# Patient Record
Sex: FEMALE | Race: WHITE | NOT HISPANIC OR LATINO | URBAN - METROPOLITAN AREA
[De-identification: names, ages, dates, MRNs, and addresses within clinical notes are randomized per-mention and may not be internally consistent; named-entity substitution may affect disease eponyms.]

---

## 2017-08-14 ENCOUNTER — TELEPHONE (OUTPATIENT)
Dept: TRANSPLANT | Facility: CLINIC | Age: 52
End: 2017-08-14

## 2017-08-14 NOTE — TELEPHONE ENCOUNTER
"Logged in as: [email or web address]. Logout.  Incomplete   Pending   Registered   Archived Change Log Done Living Kidney Donor Evaluation Completed: Aug 12, 2017 09:51:13 CT Updated: Aug 12, 2017 09:53:10 CT  Donor Name: Lamont Boyle MRN: [id number] Note: : 1965 Age: 52Gender: Female Donor Height: 5  4\" Weight (lb): 190 BMI: 32.6  Donor Race:  Ethnicity: Not / Donor Preferred Language: English  Required?: No Current Marital Status:   Demographics: Home Address: [address] [address] City: [place] State: [place] Zip: [address] Country: [place]  Best Phone: [phone] Alt Phone: Donor Email: [email or web address] Best Phone Type: Mobile Alt Phone Type:   Preferred Contact Time(s): 09:00 AM-11:00 AM Preferred Contact Day(s): Mon  Donor Screen: PASSED Donor Referred by: Social Media Donor self reported ABO: Unknown  Recipient Information: Recipient Name (Last, First): Ayla Liz Recipient :    1962  ... Donor Relationship: Met through social media Recipient Diagnosis: Recipient ABO:   MEDICAL HISTORY:  GERD  Insomnia  Stress Tests, Normal 2015  To Help Me Lose Weight  Urge Incontinence  other (\"Hot Flashes\")  other (\"I was low\")  MEDICATIONS:  Amitriptyline  Omeprazole  Topiramate  Vitamin D  SURGICAL HISTORY:  Cholecystectomy  Hysterectomy  Tonsillectomy  Total Hip Arthroplasty, Left  Tubal Ligation  ALLERGIES:  NKDA  SOCIAL HISTORY:  EtOH: Occasional (1-2 drinks/week)  Illicit Drug Use: Remote: Cocaine, Marijuana Last Used   Tobacco: Remote; Quit ; (1 ppd x 20 years)  SELF-REPORTED FUNCTIONAL STATUS:  \"I am able to participate in moderate recreational activities like golf, double tennis, dancing, throwing a baseball or football\"  Exercise (Not on a regular basis)  REVIEW OF ORGAN SYSTEMS: Airway or Lungs: No Blood Disorder: No Cancer: No Diabetes,Thyroid,Adrenal,Endocrine Disorder: No Digestive or Liver: Yes Female Health: No Heart or Circulatory System: No Immune " Diseases: No Kidneys and Bladder: Yes Muscles,Bones,Joints: No Neuro: No Psych: Yes  FAMILY HISTORY: Confirmed:  Cancer (Father, Grandparent)  Diabetes (Father, Sibling)  Heart Disease (Father, Grandparent, Sibling)  Hypertension (Father, Sibling, Mother, Grandparent, Cousin, Aunt or Uncle)  Kidney Stones (Sibling, Cousin)  Denied:  Kidney Disease (denies)  DONOR INFORMATION:  Level of Education: Some college education Employment Status: Self Employed Medical Insurance Status: Has medical insurance Current Accommodation: Other Living Arrangement: Other Allow Disclosure to Recipient: Yes Paired Kidney Exchange Education Level: None Paired Kidney Exchange Participation Consent: Unsure Donor Motivation: Highly motivated donor  HIGH RISK BEHAVIOR:  Blood transfusion < 12 months. (NO)  Commercial sex < 12 months. (NO)  Illicit IV drug use < 5yrs. (NO)  Other high risk sexual contact < 12 months. (NO)  EMERGENCY CONTACT INFORMATION:  Primary Secondary First Name: Sally Last Name: Yesica Phone Number: [phone] Relationship: Cousin  First Name: Calixto  Last Name: [place] Phone Number: [phone] Relationship: Child  REASON FOR DONATION:   I was moved by her story. I also have four children and can't imagine leaving them behind early. I want to make a difference in this world and this maybe a way I can make a difference in one families lives.  PHYSICIAN CONTACT INFORMATION:  PCP  Name:    City: State:   Phone:   ADDITIONAL NOTES:   REVIEWED BY:    Jacqueline  TODAY'S DATE:   08/14/2017  ... Done  I was moved by her story. I also have four children and can't imagine leaving them behind early. I want to make a difference in this world and this maybe a way I can make a difference in one families lives.

## 2017-08-15 NOTE — TELEPHONE ENCOUNTER
lg response for this recip. Left message telling Lamont we received her questionaire and we need to carefully go through the donors. When ready to cont testing we will contact her. No pkt sent.Lives in [place].